# Patient Record
Sex: FEMALE | Race: ASIAN | NOT HISPANIC OR LATINO | Employment: PART TIME | URBAN - METROPOLITAN AREA
[De-identification: names, ages, dates, MRNs, and addresses within clinical notes are randomized per-mention and may not be internally consistent; named-entity substitution may affect disease eponyms.]

---

## 2019-03-03 ENCOUNTER — OFFICE VISIT (OUTPATIENT)
Dept: URGENT CARE | Facility: CLINIC | Age: 24
End: 2019-03-03
Payer: COMMERCIAL

## 2019-03-03 VITALS
WEIGHT: 170 LBS | BODY MASS INDEX: 27.32 KG/M2 | TEMPERATURE: 97.9 F | RESPIRATION RATE: 16 BRPM | HEART RATE: 107 BPM | OXYGEN SATURATION: 97 % | SYSTOLIC BLOOD PRESSURE: 130 MMHG | DIASTOLIC BLOOD PRESSURE: 88 MMHG | HEIGHT: 66 IN

## 2019-03-03 DIAGNOSIS — J02.9 PHARYNGITIS, UNSPECIFIED ETIOLOGY: ICD-10-CM

## 2019-03-03 DIAGNOSIS — J02.9 SORE THROAT: ICD-10-CM

## 2019-03-03 LAB
INT CON NEG: NEGATIVE
INT CON POS: POSITIVE
S PYO AG THROAT QL: NEGATIVE

## 2019-03-03 PROCEDURE — 87880 STREP A ASSAY W/OPTIC: CPT | Performed by: PHYSICIAN ASSISTANT

## 2019-03-03 PROCEDURE — 99203 OFFICE O/P NEW LOW 30 MIN: CPT | Performed by: PHYSICIAN ASSISTANT

## 2019-03-03 ASSESSMENT — ENCOUNTER SYMPTOMS
DIZZINESS: 0
EYE REDNESS: 0
EYE DISCHARGE: 0
SWOLLEN GLANDS: 1
TINGLING: 0
COUGH: 0
VOMITING: 0
DIARRHEA: 0
HEADACHES: 0
SORE THROAT: 1
MYALGIAS: 1
WHEEZING: 0
CHILLS: 1

## 2019-03-03 NOTE — PROGRESS NOTES
"Subjective:      Mercedes Cano is a 23 y.o. female who presents with Pharyngitis (x2 days, hard to swallow, swollen lymph nodes, white spots)              Patient is a 23-year-old female who presents to urgent care with sore throat, painful swallowing, enlarged lymph nodes for the last 2 days.  Patient does report slight body aches however denies any fevers.      Pharyngitis    This is a new problem. Episode onset: 2 days ago. The problem has been unchanged. There has been no fever. Associated symptoms include swollen glands. Pertinent negatives include no congestion, coughing, diarrhea, ear discharge, headaches or vomiting. She has had exposure to strep. Exposure to: Classmates were positive for strep. She has tried acetaminophen (Throat lozenges) for the symptoms. The treatment provided mild relief.       Review of Systems   Constitutional: Positive for chills and malaise/fatigue.   HENT: Positive for sore throat. Negative for congestion and ear discharge.    Eyes: Negative for discharge and redness.   Respiratory: Negative for cough and wheezing.    Gastrointestinal: Negative for diarrhea and vomiting.   Genitourinary: Negative for dysuria and urgency.   Musculoskeletal: Positive for myalgias.   Skin: Negative for itching and rash.   Neurological: Negative for dizziness, tingling and headaches.   All other systems reviewed and are negative.         Objective:     /88   Pulse (!) 107   Temp 36.6 °C (97.9 °F)   Resp 16   Ht 1.676 m (5' 6\")   Wt 77.1 kg (170 lb)   SpO2 97%   BMI 27.44 kg/m²    PMH:  has no past medical history on file.  MEDS: No current outpatient prescriptions on file.  ALLERGIES: No Known Allergies  SURGHX: History reviewed. No pertinent surgical history.  SOCHX:  reports that she has never smoked. She has never used smokeless tobacco. She reports that she drinks alcohol. She reports that she does not use drugs.  FH: Family history was reviewed, no pertinent findings to " report    Physical Exam   Constitutional: She is oriented to person, place, and time. She appears well-developed and well-nourished.   HENT:   Head: Normocephalic and atraumatic.   Right Ear: External ear normal.   Left Ear: External ear normal.   Nose: Nose normal.   Mouth/Throat: No oropharyngeal exudate.   Posterior oropharynx with tonsillar edema without notable exudate.  Without evidence of abscess formation.    Eyes: Pupils are equal, round, and reactive to light. EOM are normal.   Neck: Normal range of motion. Neck supple. No thyromegaly present.   Cardiovascular: Normal rate and regular rhythm.    Pulmonary/Chest: Effort normal and breath sounds normal. No respiratory distress.   Musculoskeletal: Normal range of motion. She exhibits no edema.   Lymphadenopathy:     She has cervical adenopathy.   Neurological: She is alert and oriented to person, place, and time.   Skin: Skin is warm. No rash noted. No pallor.   Psychiatric: She has a normal mood and affect. Her behavior is normal.   Vitals reviewed.            Strep negative  Assessment/Plan:       1. Sore throat  - POCT Rapid Strep A  - mag hydrox-al hydrox-simeth-diphenhydrAMINE-lidocaine viscous 2%; Swish, gargle, and spit, one to two teaspoonfuls every six hours as needed. Shake well before using.  Dispense: 120 mL; Refill: 0    2. Pharyngitis, unspecified etiology  - mag hydrox-al hydrox-simeth-diphenhydrAMINE-lidocaine viscous 2%; Swish, gargle, and spit, one to two teaspoonfuls every six hours as needed. Shake well before using.  Dispense: 120 mL; Refill: 0    It was explained to the pt. Today that due to the viral nature of the pt's illness, we will treat symptomatically today. Encouraged OTC supportive meds PRN. Humidification, increase fluids, avoid night time dairy.   Patient given precautionary s/sx that mandate immediate follow up and evaluation in the ED. Advised of risks of not doing so.    DDX, Supportive care, and indications for immediate  follow-up discussed with patient.    Instructed to return to clinic or nearest emergency department if we are not available for any change in condition, further concerns, or worsening of symptoms.    The patient demonstrated a good understanding and agreed with the treatment plan.

## 2019-04-27 ENCOUNTER — APPOINTMENT (OUTPATIENT)
Dept: RADIOLOGY | Facility: MEDICAL CENTER | Age: 24
End: 2019-04-27
Attending: EMERGENCY MEDICINE
Payer: COMMERCIAL

## 2019-04-27 ENCOUNTER — HOSPITAL ENCOUNTER (EMERGENCY)
Facility: MEDICAL CENTER | Age: 24
End: 2019-04-27
Attending: EMERGENCY MEDICINE
Payer: COMMERCIAL

## 2019-04-27 VITALS
HEART RATE: 87 BPM | HEIGHT: 65 IN | RESPIRATION RATE: 16 BRPM | BODY MASS INDEX: 31.44 KG/M2 | OXYGEN SATURATION: 98 % | WEIGHT: 188.71 LBS | DIASTOLIC BLOOD PRESSURE: 95 MMHG | SYSTOLIC BLOOD PRESSURE: 145 MMHG | TEMPERATURE: 97.8 F

## 2019-04-27 DIAGNOSIS — S92.351A CLOSED DISPLACED FRACTURE OF FIFTH METATARSAL BONE OF RIGHT FOOT, INITIAL ENCOUNTER: ICD-10-CM

## 2019-04-27 PROCEDURE — 73630 X-RAY EXAM OF FOOT: CPT | Mod: RT

## 2019-04-27 PROCEDURE — 99284 EMERGENCY DEPT VISIT MOD MDM: CPT

## 2019-04-27 RX ORDER — HYDROCODONE BITARTRATE AND ACETAMINOPHEN 5; 325 MG/1; MG/1
1-2 TABLET ORAL EVERY 6 HOURS PRN
Qty: 20 TAB | Refills: 0 | Status: SHIPPED | OUTPATIENT
Start: 2019-04-27 | End: 2019-05-02

## 2019-04-27 RX ORDER — SUMATRIPTAN 25 MG/1
25-100 TABLET, FILM COATED ORAL
COMMUNITY

## 2019-04-27 RX ORDER — ETONOGESTREL AND ETHINYL ESTRADIOL VAGINAL RING .015; .12 MG/D; MG/D
1 RING VAGINAL
Status: ON HOLD | COMMUNITY
End: 2023-04-09

## 2019-04-27 RX ORDER — HYDROCODONE BITARTRATE AND ACETAMINOPHEN 5; 325 MG/1; MG/1
1 TABLET ORAL ONCE
Status: DISCONTINUED | OUTPATIENT
Start: 2019-04-27 | End: 2019-04-27 | Stop reason: HOSPADM

## 2019-04-27 ASSESSMENT — ENCOUNTER SYMPTOMS
TINGLING: 0
FOCAL WEAKNESS: 0

## 2019-04-27 NOTE — ED NOTES
Patient to purple [facility].  Agree with triage note.   Right lateral footing swelling and abrasions to 4th&5th toes noted.
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
## 2020-05-21 ENCOUNTER — HOSPITAL ENCOUNTER (OUTPATIENT)
Facility: MEDICAL CENTER | Age: 25
End: 2020-05-21
Payer: COMMERCIAL

## 2020-05-27 LAB
SARS-COV-2 RNA SPEC QL NAA+PROBE: NOT DETECTED
SPECIMEN SOURCE: NORMAL

## 2023-02-03 ENCOUNTER — APPOINTMENT (OUTPATIENT)
Dept: RADIOLOGY | Facility: MEDICAL CENTER | Age: 28
End: 2023-02-03
Payer: COMMERCIAL

## 2023-04-03 ENCOUNTER — APPOINTMENT (OUTPATIENT)
Dept: RADIOLOGY | Facility: MEDICAL CENTER | Age: 28
End: 2023-04-03
Payer: COMMERCIAL

## 2023-04-07 ENCOUNTER — HOSPITAL ENCOUNTER (INPATIENT)
Facility: MEDICAL CENTER | Age: 28
LOS: 2 days | DRG: 776 | End: 2023-04-09
Attending: OBSTETRICS & GYNECOLOGY | Admitting: OBSTETRICS & GYNECOLOGY
Payer: COMMERCIAL

## 2023-04-07 DIAGNOSIS — Z78.9 BREASTFEEDING (INFANT): ICD-10-CM

## 2023-04-07 LAB
ABO GROUP BLD: NORMAL
BLD GP AB SCN SERPL QL: NORMAL
ERYTHROCYTE [DISTWIDTH] IN BLOOD BY AUTOMATED COUNT: 40.4 FL (ref 35.9–50)
ERYTHROCYTE [DISTWIDTH] IN BLOOD BY AUTOMATED COUNT: 40.9 FL (ref 35.9–50)
HBV SURFACE AG SER QL: NORMAL
HCT VFR BLD AUTO: 30.7 % (ref 37–47)
HCT VFR BLD AUTO: 35.2 % (ref 37–47)
HCV AB SER QL: NORMAL
HGB BLD-MCNC: 10.5 G/DL (ref 12–16)
HGB BLD-MCNC: 12.1 G/DL (ref 12–16)
HIV 1+2 AB+HIV1 P24 AG SERPL QL IA: NORMAL
MCH RBC QN AUTO: 29.2 PG (ref 27–33)
MCH RBC QN AUTO: 29.4 PG (ref 27–33)
MCHC RBC AUTO-ENTMCNC: 34.2 G/DL (ref 33.6–35)
MCHC RBC AUTO-ENTMCNC: 34.4 G/DL (ref 33.6–35)
MCV RBC AUTO: 84.8 FL (ref 81.4–97.8)
MCV RBC AUTO: 86 FL (ref 81.4–97.8)
PLATELET # BLD AUTO: 253 K/UL (ref 164–446)
PLATELET # BLD AUTO: 306 K/UL (ref 164–446)
PMV BLD AUTO: 11.2 FL (ref 9–12.9)
PMV BLD AUTO: 11.5 FL (ref 9–12.9)
RBC # BLD AUTO: 3.57 M/UL (ref 4.2–5.4)
RBC # BLD AUTO: 4.15 M/UL (ref 4.2–5.4)
RH BLD: NORMAL
RUBV AB SER QL: 18.5 IU/ML
T PALLIDUM AB SER QL IA: NORMAL
WBC # BLD AUTO: 17.3 K/UL (ref 4.8–10.8)
WBC # BLD AUTO: 20.2 K/UL (ref 4.8–10.8)

## 2023-04-07 PROCEDURE — 700102 HCHG RX REV CODE 250 W/ 637 OVERRIDE(OP): Performed by: NURSE PRACTITIONER

## 2023-04-07 PROCEDURE — 87389 HIV-1 AG W/HIV-1&-2 AB AG IA: CPT

## 2023-04-07 PROCEDURE — 87340 HEPATITIS B SURFACE AG IA: CPT

## 2023-04-07 PROCEDURE — 86592 SYPHILIS TEST NON-TREP QUAL: CPT

## 2023-04-07 PROCEDURE — 86850 RBC ANTIBODY SCREEN: CPT

## 2023-04-07 PROCEDURE — 86900 BLOOD TYPING SEROLOGIC ABO: CPT

## 2023-04-07 PROCEDURE — 700111 HCHG RX REV CODE 636 W/ 250 OVERRIDE (IP)

## 2023-04-07 PROCEDURE — 86901 BLOOD TYPING SEROLOGIC RH(D): CPT

## 2023-04-07 PROCEDURE — 770002 HCHG ROOM/CARE - OB PRIVATE (112)

## 2023-04-07 PROCEDURE — 59409 OBSTETRICAL CARE: CPT

## 2023-04-07 PROCEDURE — A9270 NON-COVERED ITEM OR SERVICE: HCPCS | Performed by: NURSE PRACTITIONER

## 2023-04-07 PROCEDURE — 36415 COLL VENOUS BLD VENIPUNCTURE: CPT

## 2023-04-07 PROCEDURE — 85027 COMPLETE CBC AUTOMATED: CPT

## 2023-04-07 PROCEDURE — 304965 HCHG RECOVERY SERVICES

## 2023-04-07 PROCEDURE — 86803 HEPATITIS C AB TEST: CPT

## 2023-04-07 PROCEDURE — 86762 RUBELLA ANTIBODY: CPT

## 2023-04-07 PROCEDURE — 86780 TREPONEMA PALLIDUM: CPT

## 2023-04-07 RX ORDER — ACETAMINOPHEN 500 MG
1000 TABLET ORAL EVERY 6 HOURS PRN
Status: DISCONTINUED | OUTPATIENT
Start: 2023-04-07 | End: 2023-04-09 | Stop reason: HOSPADM

## 2023-04-07 RX ORDER — OXYTOCIN 10 [USP'U]/ML
10 INJECTION, SOLUTION INTRAMUSCULAR; INTRAVENOUS
Status: DISCONTINUED | OUTPATIENT
Start: 2023-04-07 | End: 2023-04-07 | Stop reason: HOSPADM

## 2023-04-07 RX ORDER — SIMETHICONE 125 MG
125 TABLET,CHEWABLE ORAL 4 TIMES DAILY PRN
Status: DISCONTINUED | OUTPATIENT
Start: 2023-04-07 | End: 2023-04-09 | Stop reason: HOSPADM

## 2023-04-07 RX ORDER — VITAMIN A ACETATE, BETA CAROTENE, ASCORBIC ACID, CHOLECALCIFEROL, .ALPHA.-TOCOPHEROL ACETATE, DL-, THIAMINE MONONITRATE, RIBOFLAVIN, NIACINAMIDE, PYRIDOXINE HYDROCHLORIDE, FOLIC ACID, CYANOCOBALAMIN, CALCIUM CARBONATE, FERROUS FUMARATE, ZINC OXIDE, CUPRIC OXIDE 3080; 12; 120; 400; 1; 1.84; 3; 20; 22; 920; 25; 200; 27; 10; 2 [IU]/1; UG/1; MG/1; [IU]/1; MG/1; MG/1; MG/1; MG/1; MG/1; [IU]/1; MG/1; MG/1; MG/1; MG/1; MG/1
1 TABLET, FILM COATED ORAL
Status: DISCONTINUED | OUTPATIENT
Start: 2023-04-07 | End: 2023-04-09 | Stop reason: HOSPADM

## 2023-04-07 RX ORDER — IBUPROFEN 800 MG/1
800 TABLET ORAL
Status: COMPLETED | OUTPATIENT
Start: 2023-04-07 | End: 2023-04-07

## 2023-04-07 RX ORDER — DOCUSATE SODIUM 100 MG/1
100 CAPSULE, LIQUID FILLED ORAL 2 TIMES DAILY PRN
Status: DISCONTINUED | OUTPATIENT
Start: 2023-04-07 | End: 2023-04-09 | Stop reason: HOSPADM

## 2023-04-07 RX ORDER — LIDOCAINE HYDROCHLORIDE 10 MG/ML
20 INJECTION, SOLUTION INFILTRATION; PERINEURAL
Status: DISCONTINUED | OUTPATIENT
Start: 2023-04-07 | End: 2023-04-07 | Stop reason: HOSPADM

## 2023-04-07 RX ORDER — SODIUM CHLORIDE, SODIUM LACTATE, POTASSIUM CHLORIDE, CALCIUM CHLORIDE 600; 310; 30; 20 MG/100ML; MG/100ML; MG/100ML; MG/100ML
INJECTION, SOLUTION INTRAVENOUS PRN
Status: DISCONTINUED | OUTPATIENT
Start: 2023-04-07 | End: 2023-04-09 | Stop reason: HOSPADM

## 2023-04-07 RX ORDER — SODIUM CHLORIDE, SODIUM LACTATE, POTASSIUM CHLORIDE, CALCIUM CHLORIDE 600; 310; 30; 20 MG/100ML; MG/100ML; MG/100ML; MG/100ML
INJECTION, SOLUTION INTRAVENOUS CONTINUOUS
Status: DISCONTINUED | OUTPATIENT
Start: 2023-04-07 | End: 2023-04-09 | Stop reason: HOSPADM

## 2023-04-07 RX ORDER — MISOPROSTOL 200 UG/1
600 TABLET ORAL
Status: DISCONTINUED | OUTPATIENT
Start: 2023-04-07 | End: 2023-04-09 | Stop reason: HOSPADM

## 2023-04-07 RX ORDER — TERBUTALINE SULFATE 1 MG/ML
0.25 INJECTION, SOLUTION SUBCUTANEOUS
Status: DISCONTINUED | OUTPATIENT
Start: 2023-04-07 | End: 2023-04-07 | Stop reason: HOSPADM

## 2023-04-07 RX ORDER — IBUPROFEN 800 MG/1
800 TABLET ORAL EVERY 8 HOURS PRN
Status: DISCONTINUED | OUTPATIENT
Start: 2023-04-07 | End: 2023-04-09 | Stop reason: HOSPADM

## 2023-04-07 RX ORDER — ACETAMINOPHEN 500 MG
1000 TABLET ORAL
Status: DISCONTINUED | OUTPATIENT
Start: 2023-04-07 | End: 2023-04-07 | Stop reason: HOSPADM

## 2023-04-07 RX ORDER — MISOPROSTOL 200 UG/1
800 TABLET ORAL
Status: DISCONTINUED | OUTPATIENT
Start: 2023-04-07 | End: 2023-04-07 | Stop reason: HOSPADM

## 2023-04-07 RX ADMIN — IBUPROFEN 800 MG: 800 TABLET, FILM COATED ORAL at 03:43

## 2023-04-07 RX ADMIN — PRENATAL WITH FERROUS FUM AND FOLIC ACID 1 TABLET: 3080; 920; 120; 400; 22; 1.84; 3; 20; 10; 1; 12; 200; 27; 25; 2 TABLET ORAL at 08:45

## 2023-04-07 RX ADMIN — OXYTOCIN 20 UNITS: 10 INJECTION, SOLUTION INTRAMUSCULAR; INTRAVENOUS at 03:45

## 2023-04-07 NOTE — H&P
Obstetrics & Gynecology History & Physical Note    Date of Service  2023    Chief Complaint  Pt here after delivery at [facility] ED in the toilet. She did not know she was pregnant. She had been told multiple times she was not pregnant because she is being followed for a pituitary tumor here in [place] and has had US and blood work done.     Denies any other medical issues other than recent dx of pituitary tumor.  Reports hx of ACL reconstruction and breast reduction  Denies hx of STIs.  Reports hx of irregular menses.     History of Presenting Illness  Mercedes Cano is a 27 y.o.  at Unknown Not found.. No LMP recorded. Patient is pregnant. She is being admitted for observation/postpartum care.     Prenatal care was not complete and is complicated by:  1. No prenatal care; unsuspected pregnancy     Patient Active Problem List    Diagnosis Date Noted    Indication for care in labor or delivery 2023       Obstetric History  OB History    Para Term  AB Living   1 0           SAB IAB Ectopic Molar Multiple Live Births                    # Outcome Date GA Lbr Joe/2nd Weight Sex Delivery Anes PTL Lv   1 Current                Gynecologic History  Unknown     Review of Systems  ROS    Medical History   has no past medical history on file.    Surgical History   has no past surgical history on file.     Family History  family history is not on file.     Social History   reports that she has never smoked. She has never used smokeless tobacco. She reports current alcohol use. She reports that she does not use drugs.    Allergies  No Known Allergies    Medications  Prior to Admission Medications   Prescriptions Last Dose Informant Patient Reported? Taking?   SUMAtriptan (IMITREX) 25 MG Tab tablet   Yes No   Sig: Take  mg by mouth Once PRN for Migraine.   ethinyl estradiol-etonogestrel (NUVARING) 0.12-0.015 MG/24HR vaginal ring   Yes No   Sig: Insert 1 Each in vagina.     "  Facility-Administered Medications: None       Physical Exam  Vitals:    04/07/23 0315   BP: 109/73   Pulse: 68   Resp: 18   Temp: 36.3 °C (97.3 °F)   TempSrc: Temporal   Weight: 86 kg (189 lb 9.5 oz)   Height: 1.676 m (5' 6\")       General:   alert, cooperative, no distress   Skin:   normal   HEENT:  extraocular movements intact   Lungs:   clear to auscultation bilaterally   Heart:   regular rate and rhythm   Breasts:   deferred   Abdomen:  Abdomen soft, non-tender   Pelvis: See del note   FHT:  Unknown as pt already posptartum   [place]:     Presentations:   Vtx   Cervix:     Dilation:  Postpartum     Effacement:      Station:       Consistency:      Position:         Laboratory:  Prenatal Results    The patient does not have a working [place]. Some results will not display without a working [place].   General (Most Recent Result)       Test Value Reference Range Date Time    ABO        Rh        Antibody screen        HbA1c        Chlamydia by PCR        Gonorrhea by PCR        RPR/Syphilus        HSV 1/2 by PCR (non-serum)        HSV 1/2 (serum)        HSV 1        HSV 2        HPV (16)        HBsAg        HIV-1 HIV-2 Antibodies        Rubella        Tb                  Pap Smear (Most Recent Result)       Test Value Reference Range Date Time    Pap smear        Pap smear w/HPV        Pap smear w/CTNG        Pap smar w/HPV CTNG        Pap smear (reflex HPV ACUS)        Pap smear (reflex HPV ASCUS w/CTNG)        Pathology gyn specimen                  Urinalysis (Most Recent Result)       Test Value Reference Range Date Time    Urinalysis        POC urinalysis        Urine drug screen (w/ conf)        Urine culture ([id number])        Urine Protein/Creatinine Ratio                  Urinalysis, Culture if indicated       Test Value Reference Range Date Time    Color        Appearance        Specific Gravity        PH        Glucose        Ketones        Protein        Bilirubin        Nitrites        Leukocytes Esterase     "    Blood        Comment        Culture                  Urine Drug Screen       Test Value Reference Range Date Time    Amphetamines        Barbiturates        Benzodiazepines        Cocaine        Methadone        Opiates        Oxycodone        Phencylidine        Propoxyphene        Marijuana Metabolite                  1st Trimester       Test Value Reference Range Date Time    Hgb        Hct        Fasting Glucose Tolerance        GTT, 1 hour        GTT, 2 hours        GTT, 3 hours                  2nd Trimester       Test Value Reference Range Date Time    Hgb        Hct        AST        ALT        Uric Acid        Fasting Glucose Tolerance        GTT, 1 hour        GTT, 2 hours        GTT, 3 hours                  3rd Trimester       Test Value Reference Range Date Time    Hgb        Hct        Platelet count        GBS (WHITE BROTH)        Fasting Glucose Tolerance        GTT, 1 hour        GTT, 2 hous        GTT, 3hours                  Congenital Disease Screening       Test Value Reference Range Date Time    First Trimester Screen        Quad Screen        BH Electrophoresis        Cystic Fibrosis Carrier Study        SMA        AFP Maternal Serum         AFP Tetra        NIPT                  Legend    ^: Historical                              Urinalysis:    No results found     Imaging:  No orders to display         Assessment:  27 y.o.  Unknown who presents with  Indication for care in labor or delivery [O75.9]    Plan:  No new Assessment & Plan notes have been filed under this hospital service since the last note was generated.  Service: Obstetrics & Gynecology    1. Admit to L&D  2. Now postpartum; see Del note  3. Social consult for no prenatal care  4. Gc/ct via urine ordered along with Prenatal panel   5. GBS unknown     VTE prophylaxis: n/a    RAMSEY King.P.R.N.

## 2023-04-07 NOTE — PROGRESS NOTES
0445 Admitted from L and D via wheelchair, Pt oriented to room educated to call if she needs assistance go to the bathroom, Assessment done fundus firm with light lochia, abdomen soft non distended, Safety precaution and plan of care discussed, Denies pain at this time, Admission care rendered.

## 2023-04-07 NOTE — LACTATION NOTE
This note was copied from a baby's chart.  Mom is a 28 y/o P1 who delivered a baby girl at [facility] ED dept. Mom was unaware she was pregnant. She was being followed by several physicians for a Pituitary tumor. Mother saw Gastro , Endo and Neuro doctors and had been told early that she was not pregnant after several tests.   Mom denies hx of Diabetes or Thyroid issues. She does have a Pituitary tumor that will be surgically remove at a future date and had a breast reduction surgery in 2015 with complete removal of areola/nipple complex and replacement after a breast reduction/lift. RODDY provided mother the Breastfeeding After Reduction website to review. (StemPathAR.org)  Mom reports breast changes during pregnancy that she attributed to her elevated prolactin levels. Mom also states she was leaking too  When LC came to room baby was in NBN having an Echo and US for possible murmur per parents.   Mom states baby has fed a few times since birth and latched well.   RODDY provided parents with information the PLASTIQ U hand expression video, [employer], StemPathAR.org and Birth and Beyond ([employer]) for learning.    1545 : LC came to room and observed baby latched on to right side. LC sat mother more upright in bed and moved baby closer tummy to tummy. Baby was latched well with no complains of pain.    Discussed demand feeds of 8 or more times in 24 hours, skin to skin and cluster feeding. Lactation will follow up in the morning

## 2023-04-07 NOTE — L&D DELIVERY NOTE
Delivery Note    PATIENT ID:  NAME:  Mercedes Cano  MRN:               [id number]  YOB: 1995      Pt here as transfer from [place] ED after delivery of fetus and placenta there, so this note is based off of report from [facility] staff received by our RN here.     On 2023  at 01:50, this 27 y.o., EGA unknown now , GBS unknown female delivered via  under no anesthesia a viable female infant weighing 6 lbs and 7 oz with APGAR scores of 8 and 9 at one and five minutes. Unknown if nuchal cord was present.   Placenta delivered around 02:00. Placenta inspected here and appeared to be intact and complete.  Pitocin infusing in IVF after pt's arrival here due to no uterotonics available at [place].  FF and bleeding light.  Upon vaginal exam, there was left labial abrasion that was hemostatic and not repaired. Pt and infant are stable and bonding.  Estimated blood loss: ?100, minimal since arrival here.      TYRONE Rangel Dr., Attending Physician

## 2023-04-07 NOTE — PROGRESS NOTES
0309- pt presents via remsa from [facility] post delivery, pt and infant stable, pt transferred to bed from [facility] and infant placed under warmer for assessment, FOB at bedside. Pt has light bleeding and the fundus firm, eriberto HANSEN called to bedside to assess.   0445- pt and  transferred to ppu via wheelchair in stable condition, report given to Mana IGNACIO

## 2023-04-08 PROCEDURE — 700111 HCHG RX REV CODE 636 W/ 250 OVERRIDE (IP): Performed by: NURSE PRACTITIONER

## 2023-04-08 PROCEDURE — 87591 N.GONORRHOEAE DNA AMP PROB: CPT

## 2023-04-08 PROCEDURE — 87491 CHLMYD TRACH DNA AMP PROBE: CPT

## 2023-04-08 PROCEDURE — 700102 HCHG RX REV CODE 250 W/ 637 OVERRIDE(OP): Performed by: NURSE PRACTITIONER

## 2023-04-08 PROCEDURE — A9270 NON-COVERED ITEM OR SERVICE: HCPCS | Performed by: NURSE PRACTITIONER

## 2023-04-08 PROCEDURE — 90471 IMMUNIZATION ADMIN: CPT

## 2023-04-08 PROCEDURE — 90715 TDAP VACCINE 7 YRS/> IM: CPT | Performed by: NURSE PRACTITIONER

## 2023-04-08 PROCEDURE — 770002 HCHG ROOM/CARE - OB PRIVATE (112)

## 2023-04-08 PROCEDURE — 3E0234Z INTRODUCTION OF SERUM, TOXOID AND VACCINE INTO MUSCLE, PERCUTANEOUS APPROACH: ICD-10-PCS | Performed by: NURSE PRACTITIONER

## 2023-04-08 RX ADMIN — DOCUSATE SODIUM 100 MG: 100 CAPSULE, LIQUID FILLED ORAL at 10:27

## 2023-04-08 RX ADMIN — ACETAMINOPHEN 1000 MG: 500 TABLET, FILM COATED ORAL at 07:31

## 2023-04-08 RX ADMIN — ACETAMINOPHEN 1000 MG: 500 TABLET, FILM COATED ORAL at 20:21

## 2023-04-08 RX ADMIN — TETANUS TOXOID, REDUCED DIPHTHERIA TOXOID AND ACELLULAR PERTUSSIS VACCINE, ADSORBED 0.5 ML: 5; 2.5; 8; 8; 2.5 SUSPENSION INTRAMUSCULAR at 17:51

## 2023-04-08 RX ADMIN — PRENATAL WITH FERROUS FUM AND FOLIC ACID 1 TABLET: 3080; 920; 120; 400; 22; 1.84; 3; 20; 10; 1; 12; 200; 27; 25; 2 TABLET ORAL at 07:32

## 2023-04-08 ASSESSMENT — PATIENT HEALTH QUESTIONNAIRE - PHQ9
1. LITTLE INTEREST OR PLEASURE IN DOING THINGS: NOT AT ALL
2. FEELING DOWN, DEPRESSED, IRRITABLE, OR HOPELESS: NOT AT ALL
SUM OF ALL RESPONSES TO PHQ9 QUESTIONS 1 AND 2: 0

## 2023-04-08 ASSESSMENT — PAIN DESCRIPTION - PAIN TYPE
TYPE: ACUTE PAIN

## 2023-04-08 ASSESSMENT — EDINBURGH POSTNATAL DEPRESSION SCALE (EPDS)
I HAVE FELT SCARED OR PANICKY FOR NO GOOD REASON: NO, NOT AT ALL
I HAVE BEEN SO UNHAPPY THAT I HAVE HAD DIFFICULTY SLEEPING: NOT AT ALL
I HAVE BEEN SO UNHAPPY THAT I HAVE BEEN CRYING: YES, MOST OF THE TIME
I HAVE BEEN ANXIOUS OR WORRIED FOR NO GOOD REASON: NO, NOT AT ALL
THINGS HAVE BEEN GETTING ON TOP OF ME: NO, I HAVE BEEN COPING AS WELL AS EVER
I HAVE BLAMED MYSELF UNNECESSARILY WHEN THINGS WENT WRONG: NOT VERY OFTEN
I HAVE BEEN ABLE TO LAUGH AND SEE THE FUNNY SIDE OF THINGS: AS MUCH AS I ALWAYS COULD
I HAVE LOOKED FORWARD WITH ENJOYMENT TO THINGS: AS MUCH AS I EVER DID
I HAVE FELT SAD OR MISERABLE: NO, NOT AT ALL
THE THOUGHT OF HARMING MYSELF HAS OCCURRED TO ME: NEVER

## 2023-04-08 NOTE — PROGRESS NOTES
2200 Pt doing well bonding with baby, Assessment done fundus firm with scant lochia, abdomen soft non distended, up to bathroom and Voiding without difficulty, Denies pain at time, Needs attended.

## 2023-04-08 NOTE — CARE PLAN
Problem: Knowledge Deficit - Postpartum  Goal: Patient will verbalize and demonstrate understanding of self and infant care  Outcome: Progressing     Problem: Altered Physiologic Condition  Goal: Patient physiologically stable as evidenced by normal lochia, palpable uterine involution and vitals within normal limits  Outcome: Progressing   The patient is Stable - Low risk of patient condition declining or worsening    Shift Goals: pain controlled, breastfeed, rest  Clinical Goals: maintain uterine good tone, pain management  Patient Goals: pain controlled, rest  Family Goals: bonding, rest    Progress made toward(s) clinical / shift goals:  pt verbalized acceptable level of pain    Patient is not progressing towards the following goals:

## 2023-04-08 NOTE — DISCHARGE PLANNING
:    Infant's UDS is positive for THC.  Report called in to Anna Aparicio with [facility].  The report is information only.  Infant is cleared to discharge home with parents once medically cleared.   Received bedside report from off going RN. Pt currently sedated and on vent. Pts skin was assessed. Turned and repositioned. IJ  in place and infusing. Mills in place. [employer] in [facility]. Vas Cath in place and CRRT currently running. Orders verified. Pt able to follow commands. Call light in reach, bed in lowest position, will continue to monitor.

## 2023-04-08 NOTE — PROGRESS NOTES
"Subjective:  Pt states she is feeling well.  Pt also complains of minimal bleeding and mild lower abdominal pain. Feeding baby via breast. No other complaints at this time.     Pt is eating, ambulating, urinating without issue.     No complaints of lightheadedness, dizziness, palpitations, HA, weakness, CP, SOB, at this time. No reports of n/v/c/d.       Objective:    VS: /72   Pulse 79   Temp 36.6 °C (97.8 °F) (Temporal)   Resp 18   Ht 1.676 m (5' 6\")   Wt 86 kg (189 lb 9.5 oz)   SpO2 100%   Breastfeeding Yes   BMI 30.60 kg/m²       Physical Exam:  General: well appearing, no apparent distress  Abdomen:soft, nontender, nondistended  Fundus: firm at level below umbilicus  Incision: n/a  Perineum: Deferred  Extremities: symmetric, no peripheral edema, calves nontender      A/P:  28yo  s/p vaginal delivery (at [place] ED and transferred here)  hospital day number 1, with no complaints at this time. VSS and Hgb 10.5 down from 12.1. Assessment is pt is stable at this time and resting in bed.    -monitor vitals  -encourage ambulation  -monitor vitals  -monitor bleeding  -pain meds prn    Dispo: pt d/c 24-48h after delivery time  "

## 2023-04-08 NOTE — CARE PLAN
The patient is Stable - Low risk of patient condition declining or worsening    Shift Goals  Clinical Goals: fundus firm, lochia WDL  Patient Goals: pain controlled, rest  Family Goals: bonding, rest    Progress made toward(s) clinical / shift goals:    Problem: Psychosocial - Postpartum  Goal: Patient will verbalize and demonstrate effective bonding and parenting behavior  Outcome: Progressing  Note: MOB independently initiates infant care, breastfeeding, diaper changes. Regularly holds, swaddles, holds infant.      Problem: Altered Physiologic Condition  Goal: Patient physiologically stable as evidenced by normal lochia, palpable uterine involution and vitals within normal limits  Outcome: Progressing  Note: Fundus firm and midline. Lochia light, rubra. Vitals within defined limits        Patient is not progressing towards the following goals:

## 2023-04-08 NOTE — PROGRESS NOTES
0730 Received report from SANDEE Adair at change of shift. Patient assessed and POC discussed. Patient is resting in bed. Fundus firm, lochia light.  Patient denies any needs at this time. Call light within reach, bed in lowest position. Patient is encouraged to call for pain/med interventions and any other needs.

## 2023-04-08 NOTE — LACTATION NOTE
Lactation follow-up visit:    Met with MOB for a lactation follow up visit.  MOB reported she is breastfeeding independently and denied pain and tissue damage to her breasts with latch.  She did report soreness at nipples following most recent feed, but does not feel it is from a shallow latch.  Lactation assistance was offered, but MOB declined.  MOB with visitor at bedside.  Infant's weight loss to date and voiding/stooling pattern remains within defined limits.    Reviewed frequency and duration of breastfeeds with parents of baby.  MOB was educated that infant needs to be woken up to feed if she has gone between 3-3 1/2 hours without a feed with rationale provided.  Also, educated MOB on the risks to infant and milk supply with early introduction of a pacifier before breastfeeding has been established.    Provided additional breastfeeding education on: milk production, cluster feeding, and signs of successful milk transfer.    Breastfeeding Plan:  Continue to offer infant the breast per feeding cues for a minimum of 8 or more feeds in a 24 hour period.    MOB verbalized understanding of all information provided to her and denied having any further questions at this time.  Encouraged MOB to call for lactation assistance as needed.

## 2023-04-09 ENCOUNTER — PHARMACY VISIT (OUTPATIENT)
Dept: PHARMACY | Facility: MEDICAL CENTER | Age: 28
End: 2023-04-09
Payer: COMMERCIAL

## 2023-04-09 VITALS
WEIGHT: 189.6 LBS | DIASTOLIC BLOOD PRESSURE: 86 MMHG | HEART RATE: 84 BPM | HEIGHT: 66 IN | SYSTOLIC BLOOD PRESSURE: 121 MMHG | RESPIRATION RATE: 18 BRPM | TEMPERATURE: 98.1 F | OXYGEN SATURATION: 94 % | BODY MASS INDEX: 30.47 KG/M2

## 2023-04-09 LAB
C TRACH DNA SPEC QL NAA+PROBE: NEGATIVE
N GONORRHOEA DNA SPEC QL NAA+PROBE: NEGATIVE
SPECIMEN SOURCE: NORMAL

## 2023-04-09 PROCEDURE — A9270 NON-COVERED ITEM OR SERVICE: HCPCS | Performed by: NURSE PRACTITIONER

## 2023-04-09 PROCEDURE — RXMED WILLOW AMBULATORY MEDICATION CHARGE: Performed by: BEHAVIOR ANALYST

## 2023-04-09 PROCEDURE — 700102 HCHG RX REV CODE 250 W/ 637 OVERRIDE(OP): Performed by: NURSE PRACTITIONER

## 2023-04-09 RX ORDER — IBUPROFEN 800 MG/1
800 TABLET ORAL EVERY 8 HOURS PRN
Qty: 30 TABLET | Refills: 0 | Status: SHIPPED | OUTPATIENT
Start: 2023-04-09

## 2023-04-09 RX ORDER — ACETAMINOPHEN 500 MG
1000 TABLET ORAL EVERY 6 HOURS PRN
Qty: 30 TABLET | Refills: 0 | Status: SHIPPED | OUTPATIENT
Start: 2023-04-09

## 2023-04-09 RX ORDER — PSEUDOEPHEDRINE HCL 30 MG
100 TABLET ORAL 2 TIMES DAILY PRN
Qty: 60 CAPSULE | Refills: 0 | Status: SHIPPED | OUTPATIENT
Start: 2023-04-09

## 2023-04-09 RX ADMIN — IBUPROFEN 800 MG: 800 TABLET, FILM COATED ORAL at 05:34

## 2023-04-09 RX ADMIN — ACETAMINOPHEN 1000 MG: 500 TABLET, FILM COATED ORAL at 05:34

## 2023-04-09 RX ADMIN — PRENATAL WITH FERROUS FUM AND FOLIC ACID 1 TABLET: 3080; 920; 120; 400; 22; 1.84; 3; 20; 10; 1; 12; 200; 27; 25; 2 TABLET ORAL at 07:48

## 2023-04-09 ASSESSMENT — PAIN DESCRIPTION - PAIN TYPE
TYPE: ACUTE PAIN

## 2023-04-09 NOTE — PROGRESS NOTES
Discharge paperwork discussed with patient for self and infant at bedside. Follow up appointment to be made by patient. [place] screening #2 dates provided. Patient verbalized understanding. Paperwork signed and dated at this time.     1405- Infant discharged in car seat with parents. Infant placed in car seat by parents and checked by RN. Bands verified. Cuddles removed. Pt in wheelchair. Family escorted off unit by RN. All questions answered at this time.

## 2023-04-09 NOTE — LACTATION NOTE
MOB reported she is breastfeeding without concern.  Lactation assistance was offered to her, but she declined.       Unable to educate MOB on the risk to infant with continued use of marijuana while breastfeeding due to visitors at bedside.  SANDEE Shafer, agreed to provide this education to MOB verbally along with written hand-out that was provided to her by this LC.    Breastfeeding plan remains unchanged.  Please see this LC's previous chart note for description.    MOB was provided with a list of breastfeeding resources available to her post discharge and standard referral placed to [facility] [place].    MOB was encouraged to call for lactation support, as needed, prior to discharge.    1000- Reviewed I&O log with MOB.  She stated she has not been consistent with charting infant's voids and stools as she was not aware that she had to.  She stated infant had an additional void at approximately 2030 last night and a stool at 0015 this morning.  This brings the total number of voids to 5 and stools to 3.    Infant's weight loss to date remains within defined limits at 8%.

## 2023-04-09 NOTE — DISCHARGE SUMMARY
Discharge Summary:     Date of Admission: 2023  Date of Discharge: 23      Admitting diagnosis:    1. Pregnancy @ Unknown      Discharge Diagnosis:   1. Status post vaginal, spontaneous.  2. Delivered at outside hospital ([place])    No past medical history on file.  OB History    Para Term  AB Living   1 1       1   SAB IAB Ectopic Molar Multiple Live Births           0 1      # Outcome Date GA Lbr Joe/2nd Weight Sex Delivery Anes PTL Lv   1 Para 23   2.92 kg (6 lb 7 oz) F Vag-Spont None N NERIS      Complications: Precipitous delivery, delivered (current hospitalization)     No past surgical history on file.  Patient has no known allergies.    Patient Active Problem List   Diagnosis    Indication for care in labor or delivery       Hospital Course:   Per H&P: Mercedes Cano is a 27 y.o.  at Unknown Not found. No LMP recorded. She is being admitted for observation/postpartum care. Pt here after delivery at [facility] ED in the toilet. She did not know she was pregnant. She had been told multiple times she was not pregnant because she is being followed for a pituitary tumor here in [place] and has had US and blood work done.    VSS, minimal bleeding w/ stable Hgb of 10.5. Pt stable for d/c w/ ED return precautions.    Postpartum course notable for early ambulation, well managed pain, tolerance of diet, spontaneous voiding, and appropriate feeding of infant. She has remained afebrile and blood pressure has been well controlled. All maternal questions and concerns addressed    Physical Exam:  Temp:  [36.7 °C (98.1 °F)] 36.7 °C (98.1 °F)  Pulse:  [84-86] 84  Resp:  [18] 18  BP: (121-127)/(86-88) 121/86  SpO2:  [94 %] 94 %  Physical Exam  General: well appearing, no apparent distress  Abdomen: soft, nontender, nondistended  Fundus: firm at level below umbilicus  Incision: not applicable, (vaginal delivery)  Perineum: Deferred  Extremities: symmetric, no peripheral edema, calves  nontender    Current Facility-Administered Medications   Medication Dose    LR infusion      oxytocin (Pitocin) infusion (for post delivery)  125 mL/hr    lactated ringers infusion      docusate sodium (COLACE) capsule 100 mg  100 mg    ibuprofen (MOTRIN) tablet 800 mg  800 mg    acetaminophen (TYLENOL) tablet 1,000 mg  1,000 mg    PRN oxytocin (PITOCIN) (20 Units/1000 mL) PRN for excessive uterine bleeding - See Admin Instr  125-999 mL/hr    miSOPROStol (CYTOTEC) tablet 600 mcg  600 mcg    prenatal plus vitamin (STUARTNATAL 1+1) 27-1 MG tablet 1 Tablet  1 Tablet    simethicone (Mylicon) chewable tablet 125 mg  125 mg       Recent Labs     23  0326 23  1345   WBC 20.2* 17.3*   RBC 4.15* 3.57*   HEMOGLOBIN 12.1 10.5*   HEMATOCRIT 35.2* 30.7*   MCV 84.8 86.0   MCH 29.2 29.4   MCHC 34.4 34.2   RDW 40.4 40.9   PLATELETCT 306 253   MPV 11.5 11.2         Activity/ Discharge Instructions::   Discharge to home  Pelvic Rest x 6 weeks  No heavy lifting x4 weeks  Call or come to ED for: heavy vaginal bleeding, fever >100.4, severe abdominal pain, severe headache, chest pain, shortness of breath,  N/V, incisional drainage, or other concerns.       Follow up:  [facility] in 5 weeks for vaginal delivery; 1 week for incision check for  delivery.     Discharge Meds:   Current Outpatient Medications   Medication Sig Dispense Refill    acetaminophen (TYLENOL) 500 MG Tab Take 2 Tablets by mouth every 6 hours as needed for Moderate Pain. 30 Tablet 0    docusate sodium 100 MG Cap Take 100 mg by mouth 2 times a day as needed for Constipation. 60 Capsule 0    ibuprofen (MOTRIN) 800 MG Tab Take 1 Tablet by mouth every 8 hours as needed for Moderate Pain. 30 Tablet 0           Franklyn Lucero M.D.    I have personally discussed the management with the resident. I reviewed the resident's note and agree with the documented findings and plan of care.    Additional attending comments:  26 y/o G1 now P1 delivered  in field and transferred in. Uncomplicated postpartum care. Cleared for discharge.

## 2023-04-09 NOTE — CARE PLAN
Problem: Altered Physiologic Condition  Goal: Patient physiologically stable as evidenced by normal lochia, palpable uterine involution and vitals within normal limits  Outcome: Progressing     Problem: Pain - Standard  Goal: Alleviation of pain or a reduction in pain to the patient’s comfort goal  Outcome: Progressing     The patient is Stable - Low risk of patient condition declining or worsening    Shift Goals  Clinical Goals: Pain control/ maintain firm fundus with scant to light bleeding  Patient Goals:   Family Goals:     Progress made toward(s) clinical / shift goals:  Patient requests to call for pain medication when needed. Patient aware of available prn medication and available nonpharmacologic pain intervention. Patient able to care for self and infant at current pain level.  Fundus firm. Lochia scant. Patient educated to call if saturating a pad in more than hour or for large clots.      Patient is not progressing towards the following goals:

## 2023-04-09 NOTE — PROGRESS NOTES
0715- Received report from SANDEE Eller. Assumed care of pt. Declines needs at this time. 12 hour chart check, MAR and orders reviewed.     0800- Assessment complete. Fundus firm and palpable, lochia scant rubra. Pain management discussed with pt. Pt will call for pain medication as needed. Ambulating and voiding without difficulty. POC discussed. All questions and concerns discussed at this time. Bedside table and call light within reach.

## 2023-04-09 NOTE — DISCHARGE INSTRUCTIONS

## 2023-04-27 ENCOUNTER — POST PARTUM (OUTPATIENT)
Dept: OBGYN | Facility: CLINIC | Age: 28
End: 2023-04-27
Payer: COMMERCIAL

## 2023-04-27 VITALS — SYSTOLIC BLOOD PRESSURE: 123 MMHG | BODY MASS INDEX: 26.79 KG/M2 | WEIGHT: 166 LBS | DIASTOLIC BLOOD PRESSURE: 72 MMHG

## 2023-04-27 DIAGNOSIS — G43.809 OTHER MIGRAINE WITHOUT STATUS MIGRAINOSUS, NOT INTRACTABLE: ICD-10-CM

## 2023-04-27 PROBLEM — R19.00 SWOLLEN ABDOMEN: Status: ACTIVE | Noted: 2023-04-05

## 2023-04-27 PROBLEM — E24.0: Status: ACTIVE | Noted: 2023-04-05

## 2023-04-27 PROCEDURE — 0503F POSTPARTUM CARE VISIT: CPT | Performed by: OBSTETRICS & GYNECOLOGY

## 2023-04-27 ASSESSMENT — EDINBURGH POSTNATAL DEPRESSION SCALE (EPDS)
I HAVE BLAMED MYSELF UNNECESSARILY WHEN THINGS WENT WRONG: NOT VERY OFTEN
I HAVE FELT SAD OR MISERABLE: NO, NOT AT ALL
I HAVE FELT SCARED OR PANICKY FOR NO GOOD REASON: NO, NOT MUCH
I HAVE BEEN ANXIOUS OR WORRIED FOR NO GOOD REASON: NO, NOT AT ALL
THE THOUGHT OF HARMING MYSELF HAS OCCURRED TO ME: NEVER
I HAVE LOOKED FORWARD WITH ENJOYMENT TO THINGS: AS MUCH AS I EVER DID
THINGS HAVE BEEN GETTING ON TOP OF ME: NO, I HAVE BEEN COPING AS WELL AS EVER
I HAVE FELT SCARED OR PANICKY FOR NO GOOD REASON: NO, NOT AT ALL
TOTAL SCORE: 2
I HAVE BEEN SO UNHAPPY THAT I HAVE BEEN CRYING: NO, NEVER
TOTAL SCORE: 2
I HAVE BEEN SO UNHAPPY THAT I HAVE BEEN CRYING: NO, NEVER
I HAVE FELT SAD OR MISERABLE: NO, NOT AT ALL
I HAVE BLAMED MYSELF UNNECESSARILY WHEN THINGS WENT WRONG: NOT VERY OFTEN
I HAVE BEEN ANXIOUS OR WORRIED FOR NO GOOD REASON: NO, NOT AT ALL
THINGS HAVE BEEN GETTING ON TOP OF ME: NO, MOST OF THE TIME I HAVE COPED QUITE WELL
I HAVE BEEN SO UNHAPPY THAT I HAVE HAD DIFFICULTY SLEEPING: NOT AT ALL
I HAVE BEEN ABLE TO LAUGH AND SEE THE FUNNY SIDE OF THINGS: AS MUCH AS I ALWAYS COULD
THE THOUGHT OF HARMING MYSELF HAS OCCURRED TO ME: NEVER
I HAVE LOOKED FORWARD WITH ENJOYMENT TO THINGS: AS MUCH AS I EVER DID
I HAVE BEEN ABLE TO LAUGH AND SEE THE FUNNY SIDE OF THINGS: AS MUCH AS I ALWAYS COULD
I HAVE BEEN SO UNHAPPY THAT I HAVE HAD DIFFICULTY SLEEPING: NOT AT ALL

## 2023-04-27 ASSESSMENT — FIBROSIS 4 INDEX: FIB4 SCORE: 0.58

## 2023-04-27 NOTE — PROGRESS NOTES
Post-Partum Visit    CC: 2 weeks post-partum visit    HPI: 27 y.o.  s/p vaginal, spontaneous (PRECIPITOUS) on 23 @ [facility].  She did not know she was pregnant. 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 She was planned to have a CT abdomen on the day of her delivery!  Baby girl Audi was born @ 6lbs 4oz and is healthy and doing great. She is breast feeding and baby has gained over a pound since delivery.     Pt reports she feels great with the exception that she suffers from migraines and knows her medications are unsafe for breastfeeding and has not been taking anything other than tylenol.     BFing: YES    EDPDS: 2  She denies SI/HI    Last pap: approx 5 years    ROS:  gen: denies general concerns, fevers  Breast: denies pain, redness, concerns  abd: denies abd pain, GI concerns  : denies vaginal bleeding, discharge, pain    Past Medical History:   Diagnosis Date    Pituitary tumor 2023       Physical Exam:  /72   Wt 166 lb   BMI 26.79 kg/m²   gen: AAO, NAD  Breasts: symmetric, no masses, nontender, no skin changes  abd: soft, NT, ND, no masses     : NEFG, normal vagina and cervix, dark blood (lochia) present in vault and on cervix   Uterus minimally enlarged, nontender, anteverted, no adnexal masses/tenderness   Ext: NT, no edema    A/P: 27 y.o.  s/p precipitous spontaneous vaginal delivery  - pap due  - BFing, encouraged PNV use, lactation if needed  - no signs of postpartum depression  - contraception: will use progesterone only pill until wens from breastfeeding and then desires to return to using nuvaring.   - has follow up with neurosurgery on  and will discuss botox for migraine treatment in addition to following up on pituitary tumor.    RTC 4 weeks for postpartum visit, pap, and follow up

## 2023-04-27 NOTE — PROGRESS NOTES
Pt here today for postpartum exam.  Delivery type:  @ [facility] 23  Currently : Breastfeeding   Desired BCM: Nuva Ring   LMP: Not since delivery   Last pap: more than 5 years ago, will update today.   Phone # [phone]

## 2023-05-21 ASSESSMENT — EDINBURGH POSTNATAL DEPRESSION SCALE (EPDS)
THE THOUGHT OF HARMING MYSELF HAS OCCURRED TO ME: NEVER
I HAVE BEEN ABLE TO LAUGH AND SEE THE FUNNY SIDE OF THINGS: AS MUCH AS I ALWAYS COULD
THINGS HAVE BEEN GETTING ON TOP OF ME: NO, MOST OF THE TIME I HAVE COPED QUITE WELL
TOTAL SCORE: 7
I HAVE BEEN SO UNHAPPY THAT I HAVE HAD DIFFICULTY SLEEPING: NOT VERY OFTEN
I HAVE FELT SCARED OR PANICKY FOR NO GOOD REASON: NO, NOT MUCH
I HAVE FELT SAD OR MISERABLE: NOT VERY OFTEN
I HAVE BEEN SO UNHAPPY THAT I HAVE BEEN CRYING: NO, NEVER
I HAVE BLAMED MYSELF UNNECESSARILY WHEN THINGS WENT WRONG: NOT VERY OFTEN
I HAVE LOOKED FORWARD WITH ENJOYMENT TO THINGS: AS MUCH AS I EVER DID
I HAVE BEEN ANXIOUS OR WORRIED FOR NO GOOD REASON: YES, SOMETIMES

## 2023-05-22 ENCOUNTER — POST PARTUM (OUTPATIENT)
Dept: OBGYN | Facility: CLINIC | Age: 28
End: 2023-05-22
Payer: COMMERCIAL

## 2023-05-22 ENCOUNTER — HOSPITAL ENCOUNTER (OUTPATIENT)
Facility: MEDICAL CENTER | Age: 28
End: 2023-05-22
Attending: OBSTETRICS & GYNECOLOGY
Payer: COMMERCIAL

## 2023-05-22 VITALS — WEIGHT: 169 LBS | DIASTOLIC BLOOD PRESSURE: 78 MMHG | BODY MASS INDEX: 27.28 KG/M2 | SYSTOLIC BLOOD PRESSURE: 119 MMHG

## 2023-05-22 DIAGNOSIS — Z12.4 SCREENING FOR CERVICAL CANCER: ICD-10-CM

## 2023-05-22 PROCEDURE — 88175 CYTOPATH C/V AUTO FLUID REDO: CPT

## 2023-05-22 PROCEDURE — 3074F SYST BP LT 130 MM HG: CPT | Performed by: OBSTETRICS & GYNECOLOGY

## 2023-05-22 PROCEDURE — 0503F POSTPARTUM CARE VISIT: CPT | Performed by: OBSTETRICS & GYNECOLOGY

## 2023-05-22 PROCEDURE — 3078F DIAST BP <80 MM HG: CPT | Performed by: OBSTETRICS & GYNECOLOGY

## 2023-05-22 RX ORDER — ACETAMINOPHEN AND CODEINE PHOSPHATE 120; 12 MG/5ML; MG/5ML
1 SOLUTION ORAL DAILY
Qty: 28 TABLET | Refills: 5 | Status: SHIPPED | OUTPATIENT
Start: 2023-05-22

## 2023-05-22 ASSESSMENT — FIBROSIS 4 INDEX: FIB4 SCORE: 0.58

## 2023-05-22 NOTE — PROGRESS NOTES
Pt here today for postpartum exam.  Delivery Date 4/7/23 @ [facility]  Currently: breastfeeding  BCM: would like Nuva Ring or mini pill ,  Good ph:[phone]

## 2023-05-22 NOTE — PROGRESS NOTES
Post-Partum Visit    CC: post-partum    HPI: 27 y.o.  s/p vaginal, spontaneous- precipitous on 23 @ [facility]. SHe did not know she was pregnant. She was beeing seen for a pituitary tumor and hypercortisolism and delivered a term infant.   Baby girl Audi is doing great! Breast feeding and gaining weight.     Mercedes has an appointment at [facility] in [place] at the end of July to evaluate her pituitary tumor and other medical issues including migraines. She has an apptoinment with a pain doctor in 3 days for possible botox injections.     Pt reports otherwise she is doing well.   BFing: YES    Will return to work after 8 weeks from delivery and note given. work    No menses, has not had intercourse      EDPDS: 2  She denies SI/HI  But desires referral to therapy secondary to the nature of illness and delivery.     Last pap: is due     ROS:  gen: denies general concerns, fevers  Breast: denies pain, redness, concerns  abd: denies abd pain, GI concerns  : denies vaginal bleeding, discharge, pain    Past Medical History:   Diagnosis Date    Pituitary tumor 2023       Physical Exam:  /78   Wt 169 lb   BMI 27.28 kg/m²   gen: AAO, NAD  Breasts: symmetric, no masses, nontender, no skin changes  abd: soft, NT, ND, no masses  : NEFG, normal vagina and cervix,    Uterus small, nontender, anteverted, no adnexal masses/tenderness   Ext: NT, no edema    A/P: 27 y.o.  s/p precipitous/ unaware of pregnancy  - migraines  -pituitary tumor  - pap done today  - BFing, encouraged PNV use, lactation if needed  - no signs of postpartum depression  - contraception: desires micronor. RX sent.     RTC annually/PRN

## 2023-05-23 DIAGNOSIS — Z12.4 SCREENING FOR CERVICAL CANCER: ICD-10-CM

## 2023-05-23 LAB — CYTOLOGY REG CYTOL: NORMAL

## 2023-06-27 ENCOUNTER — TELEPHONE (OUTPATIENT)
Dept: OBGYN | Facility: CLINIC | Age: 28
End: 2023-06-27

## 2023-06-27 NOTE — TELEPHONE ENCOUNTER
[employer] called stating they would like to know when FMLA will be completed informed them it takes 7 buisness days but our office will start on it today.     FMLA  printed and handed to herrera who states will work on it
